# Patient Record
(demographics unavailable — no encounter records)

---

## 2024-10-08 NOTE — HISTORY OF PRESENT ILLNESS
[FreeTextEntry1] : We saw Viky Villaseñor today for an urgent evaluation.  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  She has undergone the following surgical procedures:  1. 12/19/1996, takedown of the aorto-pulmonary collateral and reconstruction of the RVOT using a 10-mm RV to PA homograft conduit, Dr. Ellington. [facility] 2. 3/24/1997, VSD closure w/ [place]-dev, RPA angioplasty, insertion of a 14mm RV to PA homograft conduit, Dr. Ellington. [facility] 3. 12/19/2009, 22-mm Contegra xenograft RV to PA conduit, Dr. 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 She had an TTE and a ANA, both of which were clear for vegetations and a full body NM inflammatory localization SPECT negative for leukocytes. She was treated with 4 weeks of ceftriaxone via PICC line and had follow up blood cultures which were negative. In August she underwent emergent cholecystectomy.  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   She recently had an episode of near syncope.  While working out with her train, she developed mildly SOB and dizziness.  She felt like she was going to pass out, but did not have LOC.  Her episodes last minutes and resolve with rest.  She also has episodes in the morning after coffee as well. She has about 2 episodes per week.   She has no complaints of chest pain, palpitations, peripheral edema, syncope.  She [place] [facility] [place].

## 2024-10-08 NOTE — CARDIOLOGY SUMMARY
[Today's Date] : [unfilled] [FreeTextEntry1] : NSR, ventricular rate 70 bpm, incomplete left BBB [FreeTextEntry2] : Summary: 1. 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 2. Mild tricuspid valve regurgitation, peak systolic instantaneous gradient 21.5 mmHg. 3. No evidence of conduit stenosis or significant regurgitation. The conduit has an irregular contour and is difficult to image due to poor acoustic windows. 4. The previously visualized tiny residual ventricular septal defect is not seen on today's study. 5. Mild mitral valve regurgitation. 6. Paradoxical septal motion of interventricular septum. 7. Normal left ventricular systolic function. 8. Limited imaging of the right ventricular free wall, likely mildly decreased right ventricular systolic function in the setting of low TAPSE. No significant right ventricular chamber dilation appreciated. 9. Proximal branch pulmonary arteries noted at bifurcation. Normal caliber of proximal branch pulmonary arteries. 10. Mildly dilated aortic root. 11. No pericardial effusion. [de-identified] : 2/2/2022 [de-identified] : Ventricular Data: LVEDV (ml)/ (Indexed to BSA): 100.0/68.4 (z: -0.9;*mean +/- SD: 77.7 +/- 10.8) LVESV (ml)/ (Indexed to BSA): 45.0/30.8 LVSV (ml)/ (Indexed to BSA): 55.0/37.6 LV EF (%): 55.0 (z: -1.8;*mean +/- SD: 64 +/- 4.9) LV CO (l/min):  4.3 LV CI (l/min/m2):  2.9 LV mass (gm)/ (Indexed to BSA): 52.7/36.0 (z: -1.8;*mean +/- SD: 52 +/- 7.4)  RV volumetrics excluding conduit RVEDV (ml)/ (Indexed to BSA):  94.1/64.3 (z: -0.8;*mean +/- SD: 75.2 +/- 13.8) RVESV (ml)/ (Indexed to BSA):  35.7/24.4 RVSV (ml)/ (Indexed to BSA):  58.4/39.9 RVEF (%): 62.1 (z: 0.5;*mean +/- SD: 59.8 +/- 5)  Flow Fractions Pulmonary Regurgitation Fraction (%):  7 Aortic Regurgitant Fraction (%):  <1 Qp:Qs: 0.8 : 1 QpR:QpL: 40%: 60% using (QpR as Qp-QpL)  Measurements: z-scores using average measurements and [place] z-score system Aortic root: 3.1 X 3.0 X 3.1 cm (z: 2.2 using maximal dimension) Ascending Aorta at level of RPA: 2.7 x 2.7cm (z:1.81) Distal transverse arch: 1.8 x 1.8 cm (z: -0.04) Conduit: 2.5 X 2.2 cm (proximal); 1.9 X 1.8 cm (mid) and  1.4 X 1.6 cm (distal) Right Pulmonary Artery: 1.5 x 1.4 cm (proximal, z: 0.4) Left Pulmonary Artery: 1.5 x 1.3 cm (proximal, z: 0.3) and 1.9 x 1.8 cm (distal, z: 2.3)  IMPRESSION: TOF status post multiple procedures, most recently 22mm RV-PA conduit as noted above. There is a significant artifact (possibly from prior clip) posterior and rightward to the cardiac mass (near the left atrium and right pulmonary artery) significantly limiting visualization of surrounding structures.  Normal  biventricular end-diastolic volumes. Normal biventricular systolic function.  No regional wall motion abnormality. Negative myocardial delayed enhancement. The conduit arises from the anterior surface of the RV and courses direct posteriorly to join the pulmonary arteries. The conduit is not visualized well on cine imaging due to artifact. On black blood imaging, it appears patent in its proximal course. On angiography, it appears to have an inferolateral infolding in the midportion (measures 1.8 X 1.9 cm) and a relative narrowing in its distal portion,  pre-bifurcation (1.4 X 1.6 cm). The proximal branch pulmonary arteries are patent. The LPA is mildly dilated distally, The RPA is only visualized in its proximal course due to artifact. Estimated differential flow to the branches is 40% to the right and 60% to the left (using QpR as Qp- QpL as the QpR flow was artefactual). There is trivial to mild conduit regurgitation with a RF of 7% using PC flow and 6% using SV difference.  Mildly dilated aortic root (z: 3.8 taking a maximal dimension of 3.5 cm in systole). No significant aortic regurgitation. No obvious AP collateral seen although limited visualization due to artifact. Mild tricuspid regurgitation on SSFP imaging.  The right SVC connects normally to the right atrium. There is a left sided SVC that courses to join the right SVC through a retroaortic innominate vein. Normal IVC connecting to the right atrium.  PC imaging demonstrates normal cardiac output, trivial mild pulmonary regurgitation (RF: 7%), no significant aortic regurgitation and Qp/Qs of 0.8:1

## 2024-10-08 NOTE — HISTORY OF PRESENT ILLNESS
[FreeTextEntry1] : We saw Viky Villaseñor today for an urgent evaluation.  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  She has undergone the following surgical procedures:  1. 12/19/1996, takedown of the aorto-pulmonary collateral and reconstruction of the RVOT using a 10-mm RV to PA homograft conduit, Dr. Ellington. [facility] 2. 3/24/1997, VSD closure w/ [place]-dev, RPA angioplasty, insertion of a 14mm RV to PA homograft conduit, Dr. Ellington. [facility] 3. 12/19/2009, 22-mm Contegra xenograft RV to PA conduit, Dr. 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 She had an TTE and a ANA, both of which were clear for vegetations and a full body NM inflammatory localization SPECT negative for leukocytes. She was treated with 4 weeks of ceftriaxone via PICC line and had follow up blood cultures which were negative. In August she underwent emergent cholecystectomy.  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   She recently had an episode of near syncope.  While working out with her train, she developed mildly SOB and dizziness.  She felt like she was going to pass out, but did not have LOC.  Her episodes last minutes and resolve with rest.  She also has episodes in the morning after coffee as well. She has about 2 episodes per week.   She has no complaints of chest pain, palpitations, peripheral edema, syncope.  She goes to dentist [facility] [place].

## 2024-10-08 NOTE — REASON FOR VISIT
[Follow-Up] : a follow-up visit for [Patient] : patient [FreeTextEntry1] : pulmonary atresia/VSD [FreeTextEntry3] : s/p RV-PA homograft

## 2024-10-08 NOTE — DISCUSSION/SUMMARY
[Needs SBE Prophylaxis] : [unfilled]  needs bacterial endocarditis prophylaxis. SBE prophylaxis is indicated for dental and invasive ENT procedures. (Circulation. 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 Echocardiogram negative for vegetations and follow up cultures negative.  Today she is complaining of dizziness and SOB with exertion and coffee.  She will have an event monitor x 1 wk and CPET.   We reviewed the importance of meticulous dental hygiene and the need for regular dental cleanings with lifelong SBE prophylaxis. Aspirin is beneficial for both its thromboembolic properties and secondary prevention against endocarditis. We reviewed the importance of meticulous dental hygiene and the need for regular dental cleanings along with the need for SBE prophylaxis prior to any dental appointments.   Plan - CPET - event monitor - follow up 6 mo

## 2024-10-08 NOTE — DISCUSSION/SUMMARY
[Needs SBE Prophylaxis] : [unfilled]  needs bacterial endocarditis prophylaxis. SBE prophylaxis is indicated for dental and invasive ENT procedures. (Circulation. 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 Echocardiogram negative for vegetations and follow up cultures negative.  Today she is complaining of dizziness and SOB with exertion and coffee.  She will have an event monitor x 1 wk and CPET.   We reviewed the importance of meticulous dental hygiene and the need for regular dental cleanings with lifelong SBE prophylaxis. Aspirin is beneficial for both its thromboembolic properties and secondary prevention against endocarditis. We reviewed the importance of meticulous dental hygiene and the need for regular dental cleanings along with the need for SBE prophylaxis prior to any dental appointments.   Plan - CPET - event monitor - follow up 6 mo

## 2024-10-08 NOTE — CONSULT LETTER
[Today's Date] : [unfilled] [Name] : Name: [unfilled] [] : : ~~ [Today's Date:] : [unfilled] [Dear  ___:] : Dear Dr. [unfilled]: [Consult] : I had the pleasure of evaluating your patient, [unfilled]. My full evaluation follows. [Sincerely,] : Sincerely, [Consult - Multiple Provider] : Thank you very much for allowing us to participate in the care of this patient. If you have any questions, please do not hesitate to contact us. [FreeTextEntry4] : Dr. Washburn [FreeTextEntry5] :  [address] [facility] [FreeTextEntry6] : [place], [place] [de-identified] : Liana Reno, MSN, CPNP-AC, PC Pediatric Cardiology, Adult Congenital Cardiology [facility] Physician [facility]  Liv Powers MD, BULL Director, Adult Congenital Heart , High Risk Cardiovascular Obstetrics [facility] Physician [facility]  [address]: [phone] [facility] Office: [phone] [facility] Office: [phone]

## 2024-10-08 NOTE — CONSULT LETTER
[Today's Date] : [unfilled] [Name] : Name: [unfilled] [] : : ~~ [Today's Date:] : [unfilled] [Dear  ___:] : Dear Dr. [unfilled]: [Consult] : I had the pleasure of evaluating your patient, [unfilled]. My full evaluation follows. [Sincerely,] : Sincerely, [Consult - Multiple Provider] : Thank you very much for allowing us to participate in the care of this patient. If you have any questions, please do not hesitate to contact us. [FreeTextEntry4] : Dr. Washburn [FreeTextEntry5] :  [address] [facility] [FreeTextEntry6] : [place], [place] [de-identified] : Liana Reno, MSN, CPNP-AC, PC Pediatric Cardiology, Adult Congenital Cardiology [facility] Physician [facility]  Liv Powers MD, BULL Director, Adult Congenital Heart , High Risk Cardiovascular Obstetrics [facility] Physician [facility]  [address]: [phone] [facility] Office: [phone] [facility] Office: [phone]

## 2024-10-08 NOTE — CONSULT LETTER
[Today's Date] : [unfilled] [Name] : Name: [unfilled] [] : : ~~ [Today's Date:] : [unfilled] [Dear  ___:] : Dear Dr. [unfilled]: [Consult] : I had the pleasure of evaluating your patient, [unfilled]. My full evaluation follows. [Sincerely,] : Sincerely, [Consult - Multiple Provider] : Thank you very much for allowing us to participate in the care of this patient. If you have any questions, please do not hesitate to contact us. [FreeTextEntry4] : Dr. Washburn [FreeTextEntry5] :  [address] [facility] [FreeTextEntry6] : [place], [place] [de-identified] : Liana Reno, MSN, CPNP-AC, PC Pediatric Cardiology, Adult Congenital Cardiology [facility] Physician [facility]  Liv Powers MD, BULL Director, Adult Congenital Heart , High Risk Cardiovascular Obstetrics [facility] Physician [facility]  [address]: [phone] [facility] Office: [phone] [facility] Office: [phone]

## 2024-10-08 NOTE — CARDIOLOGY SUMMARY
[Today's Date] : [unfilled] [FreeTextEntry1] : NSR, ventricular rate 70 bpm, incomplete left BBB [FreeTextEntry2] : Summary: 1. 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 2. Mild tricuspid valve regurgitation, peak systolic instantaneous gradient 21.5 mmHg. 3. No evidence of conduit stenosis or significant regurgitation. The conduit has an irregular contour and is difficult to image due to poor acoustic windows. 4. The previously visualized tiny residual ventricular septal defect is not seen on today's study. 5. Mild mitral valve regurgitation. 6. Paradoxical septal motion of interventricular septum. 7. Normal left ventricular systolic function. 8. Limited imaging of the right ventricular free wall, likely mildly decreased right ventricular systolic function in the setting of low TAPSE. No significant right ventricular chamber dilation appreciated. 9. Proximal branch pulmonary arteries noted at bifurcation. Normal caliber of proximal branch pulmonary arteries. 10. Mildly dilated aortic root. 11. No pericardial effusion. [de-identified] : 2/2/2022 [de-identified] : Ventricular Data: LVEDV (ml)/ (Indexed to BSA): 100.0/68.4 (z: -0.9;*mean +/- SD: 77.7 +/- 10.8) LVESV (ml)/ (Indexed to BSA): 45.0/30.8 LVSV (ml)/ (Indexed to BSA): 55.0/37.6 LV EF (%): 55.0 (z: -1.8;*mean +/- SD: 64 +/- 4.9) LV CO (l/min):  4.3 LV CI (l/min/m2):  2.9 LV mass (gm)/ (Indexed to BSA): 52.7/36.0 (z: -1.8;*mean +/- SD: 52 +/- 7.4)  RV volumetrics excluding conduit RVEDV (ml)/ (Indexed to BSA):  94.1/64.3 (z: -0.8;*mean +/- SD: 75.2 +/- 13.8) RVESV (ml)/ (Indexed to BSA):  35.7/24.4 RVSV (ml)/ (Indexed to BSA):  58.4/39.9 RVEF (%): 62.1 (z: 0.5;*mean +/- SD: 59.8 +/- 5)  Flow Fractions Pulmonary Regurgitation Fraction (%):  7 Aortic Regurgitant Fraction (%):  <1 Qp:Qs: 0.8 : 1 QpR:QpL: 40%: 60% using (QpR as Qp-QpL)  Measurements: z-scores using average measurements and [place] z-score system Aortic root: 3.1 X 3.0 X 3.1 cm (z: 2.2 using maximal dimension) Ascending Aorta at level of RPA: 2.7 x 2.7cm (z:1.81) Distal transverse arch: 1.8 x 1.8 cm (z: -0.04) Conduit: 2.5 X 2.2 cm (proximal); 1.9 X 1.8 cm (mid) and  1.4 X 1.6 cm (distal) Right Pulmonary Artery: 1.5 x 1.4 cm (proximal, z: 0.4) Left Pulmonary Artery: 1.5 x 1.3 cm (proximal, z: 0.3) and 1.9 x 1.8 cm (distal, z: 2.3)  IMPRESSION: TOF status post multiple procedures, most recently 22mm RV-PA conduit as noted above. There is a significant artifact (possibly from prior clip) posterior and rightward to the cardiac mass (near the left atrium and right pulmonary artery) significantly limiting visualization of surrounding structures.  Normal  biventricular end-diastolic volumes. Normal biventricular systolic function.  No regional wall motion abnormality. Negative myocardial delayed enhancement. The conduit arises from the anterior surface of the RV and courses direct posteriorly to join the pulmonary arteries. The conduit is not visualized well on cine imaging due to artifact. On black blood imaging, it appears patent in its proximal course. On angiography, it appears to have an inferolateral infolding in the midportion (measures 1.8 X 1.9 cm) and a relative narrowing in its distal portion,  pre-bifurcation (1.4 X 1.6 cm). The proximal branch pulmonary arteries are patent. The LPA is mildly dilated distally, The RPA is only visualized in its proximal course due to artifact. Estimated differential flow to the branches is 40% to the right and 60% to the left (using QpR as Qp- QpL as the QpR flow was artefactual). There is trivial to mild conduit regurgitation with a RF of 7% using PC flow and 6% using SV difference.  Mildly dilated aortic root (z: 3.8 taking a maximal dimension of 3.5 cm in systole). No significant aortic regurgitation. No obvious AP collateral seen although limited visualization due to artifact. Mild tricuspid regurgitation on SSFP imaging.  The right SVC connects normally to the right atrium. There is a left sided SVC that courses to join the right SVC through a retroaortic innominate vein. Normal IVC connecting to the right atrium.  PC imaging demonstrates normal cardiac output, trivial mild pulmonary regurgitation (RF: 7%), no significant aortic regurgitation and Qp/Qs of 0.8:1

## 2024-10-08 NOTE — CARDIOLOGY SUMMARY
[Today's Date] : [unfilled] [FreeTextEntry1] : NSR, ventricular rate 70 bpm, incomplete left BBB [FreeTextEntry2] : Summary: 1. 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 2. Mild tricuspid valve regurgitation, peak systolic instantaneous gradient 21.5 mmHg. 3. No evidence of conduit stenosis or significant regurgitation. The conduit has an irregular contour and is difficult to image due to poor acoustic windows. 4. The previously visualized tiny residual ventricular septal defect is not seen on today's study. 5. Mild mitral valve regurgitation. 6. Paradoxical septal motion of interventricular septum. 7. Normal left ventricular systolic function. 8. Limited imaging of the right ventricular free wall, likely mildly decreased right ventricular systolic function in the setting of low TAPSE. No significant right ventricular chamber dilation appreciated. 9. Proximal branch pulmonary arteries noted at bifurcation. Normal caliber of proximal branch pulmonary arteries. 10. Mildly dilated aortic root. 11. No pericardial effusion. [de-identified] : 2/2/2022 [de-identified] : Ventricular Data: LVEDV (ml)/ (Indexed to BSA): 100.0/68.4 (z: -0.9;*mean +/- SD: 77.7 +/- 10.8) LVESV (ml)/ (Indexed to BSA): 45.0/30.8 LVSV (ml)/ (Indexed to BSA): 55.0/37.6 LV EF (%): 55.0 (z: -1.8;*mean +/- SD: 64 +/- 4.9) LV CO (l/min):  4.3 LV CI (l/min/m2):  2.9 LV mass (gm)/ (Indexed to BSA): 52.7/36.0 (z: -1.8;*mean +/- SD: 52 +/- 7.4)  RV volumetrics excluding conduit RVEDV (ml)/ (Indexed to BSA):  94.1/64.3 (z: -0.8;*mean +/- SD: 75.2 +/- 13.8) RVESV (ml)/ (Indexed to BSA):  35.7/24.4 RVSV (ml)/ (Indexed to BSA):  58.4/39.9 RVEF (%): 62.1 (z: 0.5;*mean +/- SD: 59.8 +/- 5)  Flow Fractions Pulmonary Regurgitation Fraction (%):  7 Aortic Regurgitant Fraction (%):  <1 Qp:Qs: 0.8 : 1 QpR:QpL: 40%: 60% using (QpR as Qp-QpL)  Measurements: z-scores using average measurements and [place] z-score system Aortic root: 3.1 X 3.0 X 3.1 cm (z: 2.2 using maximal dimension) Ascending Aorta at level of RPA: 2.7 x 2.7cm (z:1.81) Distal transverse arch: 1.8 x 1.8 cm (z: -0.04) Conduit: 2.5 X 2.2 cm (proximal); 1.9 X 1.8 cm (mid) and  1.4 X 1.6 cm (distal) Right Pulmonary Artery: 1.5 x 1.4 cm (proximal, z: 0.4) Left Pulmonary Artery: 1.5 x 1.3 cm (proximal, z: 0.3) and 1.9 x 1.8 cm (distal, z: 2.3)  IMPRESSION: TOF status post multiple procedures, most recently 22mm RV-PA conduit as noted above. There is a significant artifact (possibly from prior clip) posterior and rightward to the cardiac mass (near the left atrium and right pulmonary artery) significantly limiting visualization of surrounding structures.  Normal  biventricular end-diastolic volumes. Normal biventricular systolic function.  No regional wall motion abnormality. Negative myocardial delayed enhancement. The conduit arises from the anterior surface of the RV and courses direct posteriorly to join the pulmonary arteries. The conduit is not visualized well on cine imaging due to artifact. On black blood imaging, it appears patent in its proximal course. On angiography, it appears to have an inferolateral infolding in the midportion (measures 1.8 X 1.9 cm) and a relative narrowing in its distal portion,  pre-bifurcation (1.4 X 1.6 cm). The proximal branch pulmonary arteries are patent. The LPA is mildly dilated distally, The RPA is only visualized in its proximal course due to artifact. Estimated differential flow to the branches is 40% to the right and 60% to the left (using QpR as Qp- QpL as the QpR flow was artefactual). There is trivial to mild conduit regurgitation with a RF of 7% using PC flow and 6% using SV difference.  Mildly dilated aortic root (z: 3.8 taking a maximal dimension of 3.5 cm in systole). No significant aortic regurgitation. No obvious AP collateral seen although limited visualization due to artifact. Mild tricuspid regurgitation on SSFP imaging.  The right SVC connects normally to the right atrium. There is a left sided SVC that courses to join the right SVC through a retroaortic innominate vein. Normal IVC connecting to the right atrium.  PC imaging demonstrates normal cardiac output, trivial mild pulmonary regurgitation (RF: 7%), no significant aortic regurgitation and Qp/Qs of 0.8:1

## 2024-10-08 NOTE — PHYSICAL EXAM
[General Appearance - Alert] : alert [General Appearance - Well Nourished] : well nourished [General Appearance - Well-Appearing] : well appearing [Chest Surgical / Traumatic Scar] : chest incision well healed [Edema] : no edema [Abdomen Soft] : soft [FreeTextEntry1] : S1S2 split , OKSANA [id number]

## 2024-10-08 NOTE — HISTORY OF PRESENT ILLNESS
[FreeTextEntry1] : We saw Viky Villaseñor today for an urgent evaluation.  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  She has undergone the following surgical procedures:  1. 12/19/1996, takedown of the aorto-pulmonary collateral and reconstruction of the RVOT using a 10-mm RV to PA homograft conduit, Dr. Ellington. [facility] 2. 3/24/1997, VSD closure w/ [place]-dev, RPA angioplasty, insertion of a 14mm RV to PA homograft conduit, Dr. Ellington. [facility] 3. 12/19/2009, 22-mm Contegra xenograft RV to PA conduit, Dr. 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 She had an TTE and a ANA, both of which were clear for vegetations and a full body NM inflammatory localization SPECT negative for leukocytes. She was treated with 4 weeks of ceftriaxone via PICC line and had follow up blood cultures which were negative. In August she underwent emergent cholecystectomy.  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   She recently had an episode of near syncope.  While working out with her train, she developed mildly SOB and dizziness.  She felt like she was going to pass out, but did not have LOC.  Her episodes last minutes and resolve with rest.  She also has episodes in the morning after coffee as well. She has about 2 episodes per week.   She has no complaints of chest pain, palpitations, peripheral edema, syncope.  She goes to dentist [facility].

## 2024-10-08 NOTE — DISCUSSION/SUMMARY
[Needs SBE Prophylaxis] : [unfilled]  needs bacterial endocarditis prophylaxis. SBE prophylaxis is indicated for dental and invasive ENT procedures. (Circulation. 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 Echocardiogram negative for vegetations and follow up cultures negative.  Today she is complaining of dizziness and SOB with exertion and coffee.  She will have an event monitor x 1 wk and CPET.   We reviewed the importance of meticulous dental hygiene and the need for regular dental cleanings with lifelong SBE prophylaxis. Aspirin is beneficial for both its thromboembolic properties and secondary prevention against endocarditis. We reviewed the importance of meticulous dental hygiene and the need for regular dental cleanings along with the need for SBE prophylaxis prior to any dental appointments.   Plan - CPET - event monitor - follow up 6 mo

## 2025-04-07 NOTE — CARDIOLOGY SUMMARY
[de-identified] : 10/07/2024 [FreeTextEntry2] : Summary: 1. 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  2. Mild tricuspid valve regurgitation, peak systolic instantaneous gradient 21.5 mmHg. 3. No evidence of conduit stenosis or significant regurgitation. The conduit has an irregular contour and is different to image due to poor acoustic windows.  4. The previously visualized tiny residual ventricular septal defect is not seen on today's study.  5. Mild mitral valve regurgitation.  6. Paradoxical septal motion of interventricular septum.  7. Normal left ventricular systolic function.  8. Limited imaging of the right ventricular free wall, likely mildly decreased right ventricular systolic function int the setting of low TAPSE. No significant right ventricular chamber dilation appreciated.  9. Proximal branch pulmonary arteries noted at bifurcation. Normal caliber of proximal branch pulmonary arteries.  10. Mildly dilated aortic root.  11. No pericardial effusion. [de-identified] : 12/05/2024 [de-identified] : This was not a maximal test (RERmax 0.92). Max Work= 111 Ruiz (75% predicted).  No atrial ectopy. Isolated PVCs before, during, and after the test.  There were no significant ST segment changes.  The patient's max VO2= 1546 ml/min (79% of predicted) There was no evidence of restrictive or obstructive lung disease.  [de-identified] : 02/02/2022 [de-identified] : IMPRESSION: TOF status post multiple procedures, most recently 2mm RV-PA conduit as noted above. There is significant artifact (possibly from prior clip) posterior and rightward to the cardiac mass (near the left atrium and right pulmonary artery) significantly limiting visualization of surrounding structures.   Normal biventricular end-diastolic volumes. Normal biventricular systolic function. No regional wall motion abnormality. Negative myocardial delayed enhancement. The conduit arises from the anterior surface of the RV and courses direct posteriorly to join the pulmonary arteries. The conduit is not visualized well on cine imaging due to artifact. On black blood imaging, it appears patent in its proximal course. On angiography it appears to have an inferolateral infolding in the midportion (measures 1.8 x 1.9 cm) and a relative narrowing int its distal portion, pre-bifurcation (1.4 x 1.6 cm). The proximal branch pulmonary arteries are patent. The LPA is mildly dilated distally, the RPA is only visualized in its proximal course due to artifact. Estimated differential flow to the branches is 40% to the right and 50% to the left (using Qp-Ql as the QpR flow was artefactual). There is trivial to mild conduit regurgitation with a RF of 7% using PC flow and 6% using SV difference.   Mildly dilated aortic root (z: 3.8 taking a maximal dimension of 3.5 cm in systole). No significant aortic regurgitation. No obvious AP collateral seen although limited visualization due to artifact. Mild tricuspid regurgitation on SSFP imaging.   The right SVC connects normally to the right atrium. There is a left sided SVC that courses to join the right SVC through a retroaortic innominate vein. Normal IVC connecting to the right atrium.   PC imaging demonstrates normal cardiac output, trivial mild pulmonary regurgitation (RF: 7%), no significant aortic regurgitation, and Qp/Qs of 0.8:1.

## 2025-04-07 NOTE — CARDIOLOGY SUMMARY
[de-identified] : 10/07/2024 [FreeTextEntry2] : Summary: 1. 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  2. Mild tricuspid valve regurgitation, peak systolic instantaneous gradient 21.5 mmHg. 3. No evidence of conduit stenosis or significant regurgitation. The conduit has an irregular contour and is different to image due to poor acoustic windows.  4. The previously visualized tiny residual ventricular septal defect is not seen on today's study.  5. Mild mitral valve regurgitation.  6. Paradoxical septal motion of interventricular septum.  7. Normal left ventricular systolic function.  8. Limited imaging of the right ventricular free wall, likely mildly decreased right ventricular systolic function int the setting of low TAPSE. No significant right ventricular chamber dilation appreciated.  9. Proximal branch pulmonary arteries noted at bifurcation. Normal caliber of proximal branch pulmonary arteries.  10. Mildly dilated aortic root.  11. No pericardial effusion. [de-identified] : 12/05/2024 [de-identified] : This was not a maximal test (RERmax 0.92). Max Work= 111 Ruiz (75% predicted).  No atrial ectopy. Isolated PVCs before, during, and after the test.  There were no significant ST segment changes.  The patient's max VO2= 1546 ml/min (79% of predicted) There was no evidence of restrictive or obstructive lung disease.  [de-identified] : 02/02/2022 [de-identified] : IMPRESSION: TOF status post multiple procedures, most recently 2mm RV-PA conduit as noted above. There is significant artifact (possibly from prior clip) posterior and rightward to the cardiac mass (near the left atrium and right pulmonary artery) significantly limiting visualization of surrounding structures.   Normal biventricular end-diastolic volumes. Normal biventricular systolic function. No regional wall motion abnormality. Negative myocardial delayed enhancement. The conduit arises from the anterior surface of the RV and courses direct posteriorly to join the pulmonary arteries. The conduit is not visualized well on cine imaging due to artifact. On black blood imaging, it appears patent in its proximal course. On angiography it appears to have an inferolateral infolding in the midportion (measures 1.8 x 1.9 cm) and a relative narrowing int its distal portion, pre-bifurcation (1.4 x 1.6 cm). The proximal branch pulmonary arteries are patent. The LPA is mildly dilated distally, the RPA is only visualized in its proximal course due to artifact. Estimated differential flow to the branches is 40% to the right and 50% to the left (using Qp-Ql as the QpR flow was artefactual). There is trivial to mild conduit regurgitation with a RF of 7% using PC flow and 6% using SV difference.   Mildly dilated aortic root (z: 3.8 taking a maximal dimension of 3.5 cm in systole). No significant aortic regurgitation. No obvious AP collateral seen although limited visualization due to artifact. Mild tricuspid regurgitation on SSFP imaging.   The right SVC connects normally to the right atrium. There is a left sided SVC that courses to join the right SVC through a retroaortic innominate vein. Normal IVC connecting to the right atrium.   PC imaging demonstrates normal cardiac output, trivial mild pulmonary regurgitation (RF: 7%), no significant aortic regurgitation, and Qp/Qs of 0.8:1.

## 2025-04-07 NOTE — HISTORY OF PRESENT ILLNESS
[FreeTextEntry1] :  I had the pleasure of seeing VIKY MILLARD at the [facility] Adult Congenital Heart Disease Program.  As you well know, Ms. 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  Past Cardiac Surgical/Interventional Procedures: 1) 1996, Takedown of the aorto-pulmonary collateral and reconstruction of the RVOT using a 10-mm RV to PA [facility]ograft conduit[facility]  2) 1997, VSD closure w/  [place]-Boris, RPA angioplasty, insertion of a 14mm RV to PA homograft conduit[facility] 3) 2009, 22-mm Contegra xenograft RV to PA conduit, Dr. Peck, [facility]  Viky also has a history of endocarditis that was diagnosed in 2023 (cardiobacterium  bottles) following 102 months of fevers with associated fatigue after dental work. Both her TTE and ANA were clear for vegetations, and a full body NM inflammatory localization SPECT was negative for leukocytes. She was treated with 4 weeks of ceftriaxone via PICC line, and had follow up blood cultures that were negative.   Viky is seen today, 2025, feeling well. She was on Wellbutrin for some issues last year, but it resulted in hypotension, so she discontinued it. She is feeling well now without it, and is going to therapy.   She  denies chest pain, shortness of breath, palpitations, cough, hemoptysis, dizziness, syncope, orthopnea, paroxysmal nocturnal dyspnea, abdominal swelling or lower extremity swelling. She can climb a flight of stairs without difficulty.   Her holter was lost and needs to be repeated.   PMHx: - Endocarditis   Other PSHx: - Cholecystectomy  Family Hx: - Mother- COPD,    Social Hx:  - Works as a clinical dietician.  - Occasional EtOH use, denies smoking or illicit drug use.  - She exercises on a regular basis by kickboxing, , and goes to the gym.  - She goes to the dentist on a regular basis.

## 2025-04-07 NOTE — CONSULT LETTER
[Today's Date] : [unfilled] [Name] : Name: [unfilled] [] : : ~~ [Today's Date:] : [unfilled] [Dear  ___:] : Dear Dr. [unfilled]: [Consult] : I had the pleasure of evaluating your patient, [unfilled]. My full evaluation follows. [Consult - Multiple Provider] : Thank you very much for allowing us to participate in the care of this patient. If you have any questions, please do not hesitate to contact us. [Sincerely,] : Sincerely, [FreeTextEntry4] : Dr. Washburn [FreeTextEntry5] :  [address] [facility] [FreeTextEntry6] : [place], [place] [de-identified] :  Pediatric Cardiology, Adult Congenital Cardiology [facility] Physician [facility] John Kauffman [facility]  Liv Powers MD, BULL Director, Adult Congenital Heart , High Risk Cardiovascular Obstetrics [facility] Physician [facility]  [address]: [phone] [place] Office: [phone] [facility] Office: [phone]

## 2025-04-07 NOTE — HISTORY OF PRESENT ILLNESS
[FreeTextEntry1] :  I had the pleasure of seeing VIKY MILLARD at the [facility] Adult Congenital Heart Disease [facility].  As you well know, Ms. 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  Past Cardiac Surgical/Interventional Procedures: 1) 1996, Takedown of the aorto-pulmonary collateral and reconstruction of the RVOT using a 10-mm RV to PA homograft conduit, Dr. Ellington, [facility].  2) 1997, VSD closure w/  [place]-Boris, RPA angioplasty, insertion of a 14mm RV to PA homograft conduit, Dr. Ellington, [facility]. 3) 2009, 22-mm Contegra xenograft RV to PA conduit, Dr. Peck, [facility]  Viky also has a history of endocarditis that was diagnosed in 2023 (cardiobacterium  bottles) following 102 months of fevers with associated fatigue after dental work. Both her TTE and ANA were clear for vegetations, and a full body NM inflammatory localization SPECT was negative for leukocytes. She was treated with 4 weeks of ceftriaxone via PICC line, and had follow up blood cultures that were negative.   Viky is seen today, 2025, feeling well. She was on Wellbutrin for some issues last year, but it resulted in hypotension, so she discontinued it. She is feeling well now without it, and is going to therapy.   She  denies chest pain, shortness of breath, palpitations, cough, hemoptysis, dizziness, syncope, orthopnea, paroxysmal nocturnal dyspnea, abdominal swelling or lower extremity swelling. She can climb a flight of stairs without difficulty.   Her holter was lost and needs to be repeated.   PMHx: - Endocarditis   Other PSHx: - Cholecystectomy  Family Hx: - Mother- COPD,    Social Hx:  - Works as a clinical dietician.  - Occasional EtOH use, denies smoking or illicit drug use.  - She exercises on a regular basis by kickboxing, , and goes to the gym.  - She goes to the dentist on a regular basis.

## 2025-04-07 NOTE — CONSULT LETTER
[Today's Date] : [unfilled] [Name] : Name: [unfilled] [] : : ~~ [Today's Date:] : [unfilled] [Dear  ___:] : Dear Dr. [unfilled]: [Consult] : I had the pleasure of evaluating your patient, [unfilled]. My full evaluation follows. [Consult - Multiple Provider] : Thank you very much for allowing us to participate in the care of this patient. If you have any questions, please do not hesitate to contact us. [Sincerely,] : Sincerely, [FreeTextEntry4] : Dr. Washburn [FreeTextEntry5] :  [address] [facility] [FreeTextEntry6] : [place], [place] [de-identified] :  Pediatric Cardiology, Adult Congenital Cardiology [facility] Physician [facility] John Kauffman [facility]  Liv Powers MD, BULL Director, Adult Congenital Heart , High Risk Cardiovascular Obstetrics [facility] Physician [facility]  [address]: [phone] [place] Office: [phone] [facility] Office: [phone]

## 2025-04-07 NOTE — DISCUSSION/SUMMARY
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   Last appointment she c/o dizziness and SOB with exertion.  Since stopping her Welbutrin, er symptoms improved.   Her CPET demonstrated good exercise tolerance (max VO2 = 79 % of predicted) it was not a maximal test (RERmax 0.92).   Plan: -holter - labs with PMD - follow up in 6 mo with echo for yearly appointment   SBE Prophylaxis We also discussed good dental hygiene with routine dental visits every six months. As per ACC guidelines, endocarditis prophylaxis is recommended in those undergoing dental procedures with prosthetic cardiac valves, unrepaired cyanotic heart disease including palliative shunts and conduits, repaired CHD with residual defects at site of adjacent to patch or prosthetic device, repaired CHD with prosthetic material or device during the first 6 months after the procedure and in those patients with a history of infective endocarditis. Amoxicillin 2gm or another appropriate antibiotic should be taken 30 to 60 minutes prior to the procedure.   Heart Healthy Lifestyle We discussed a heart-healthy lifestyle, including Mediterranean diet, weight management, and avoiding smoking/vaping and excessive alcohol. We also discussed exercising 30 minute a day.

## 2025-04-07 NOTE — CARDIOLOGY SUMMARY
[de-identified] : 10/07/2024 [FreeTextEntry2] : Summary: 1. 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  2. Mild tricuspid valve regurgitation, peak systolic instantaneous gradient 21.5 mmHg. 3. No evidence of conduit stenosis or significant regurgitation. The conduit has an irregular contour and is different to image due to poor acoustic windows.  4. The previously visualized tiny residual ventricular septal defect is not seen on today's study.  5. Mild mitral valve regurgitation.  6. Paradoxical septal motion of interventricular septum.  7. Normal left ventricular systolic function.  8. Limited imaging of the right ventricular free wall, likely mildly decreased right ventricular systolic function int the setting of low TAPSE. No significant right ventricular chamber dilation appreciated.  9. Proximal branch pulmonary arteries noted at bifurcation. Normal caliber of proximal branch pulmonary arteries.  10. Mildly dilated aortic root.  11. No pericardial effusion. [de-identified] : 12/05/2024 [de-identified] : This was not a maximal test (RERmax 0.92). Max Work= 111 Ruiz (75% predicted).  No atrial ectopy. Isolated PVCs before, during, and after the test.  There were no significant ST segment changes.  The patient's max VO2= 1546 ml/min (79% of predicted) There was no evidence of restrictive or obstructive lung disease.  [de-identified] : 02/02/2022 [de-identified] : IMPRESSION: TOF status post multiple procedures, most recently 2mm RV-PA conduit as noted above. There is significant artifact (possibly from prior clip) posterior and rightward to the cardiac mass (near the left atrium and right pulmonary artery) significantly limiting visualization of surrounding structures.   Normal biventricular end-diastolic volumes. Normal biventricular systolic function. No regional wall motion abnormality. Negative myocardial delayed enhancement. The conduit arises from the anterior surface of the RV and courses direct posteriorly to join the pulmonary arteries. The conduit is not visualized well on cine imaging due to artifact. On black blood imaging, it appears patent in its proximal course. On angiography it appears to have an inferolateral infolding in the midportion (measures 1.8 x 1.9 cm) and a relative narrowing int its distal portion, pre-bifurcation (1.4 x 1.6 cm). The proximal branch pulmonary arteries are patent. The LPA is mildly dilated distally, the RPA is only visualized in its proximal course due to artifact. Estimated differential flow to the branches is 40% to the right and 50% to the left (using Qp-Ql as the QpR flow was artefactual). There is trivial to mild conduit regurgitation with a RF of 7% using PC flow and 6% using SV difference.   Mildly dilated aortic root (z: 3.8 taking a maximal dimension of 3.5 cm in systole). No significant aortic regurgitation. No obvious AP collateral seen although limited visualization due to artifact. Mild tricuspid regurgitation on SSFP imaging.   The right SVC connects normally to the right atrium. There is a left sided SVC that courses to join the right SVC through a retroaortic innominate vein. Normal IVC connecting to the right atrium.   PC imaging demonstrates normal cardiac output, trivial mild pulmonary regurgitation (RF: 7%), no significant aortic regurgitation, and Qp/Qs of 0.8:1.

## 2025-04-07 NOTE — HISTORY OF PRESENT ILLNESS
[FreeTextEntry1] :  I had the pleasure of seeing VIKY MILLARD at the [facility] Adult Congenital Heart Disease [facility].  As you well know, Ms. 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  Past Cardiac Surgical/Interventional Procedures: 1) 1996, Takedown of the aorto-pulmonary collateral and reconstruction of the RVOT using a 10-mm RV to PA homograft conduit, Dr. Ellington, [facility].  2) 1997, VSD closure w/  [place]-Boris, RPA angioplasty, insertion of a 14mm RV to PA homograft conduit, Dr. Ellington, [facility]. 3) 2009, 22-mm Contegra xenograft RV to PA conduit, Dr. Peck, [facility]  Viky also has a history of endocarditis that was diagnosed in 2023 (cardiobacterium  bottles) following 102 months of fevers with associated fatigue after dental work. Both her TTE and ANA were clear for vegetations, and a full body NM inflammatory localization SPECT was negative for leukocytes. She was treated with 4 weeks of ceftriaxone via PICC line, and had follow up blood cultures that were negative.   Viky is seen today, 2025, feeling well. She was on Wellbutrin for some issues last year, but it resulted in hypotension, so she discontinued it. She is feeling well now without it, and is going to therapy.   She  denies chest pain, shortness of breath, palpitations, cough, hemoptysis, dizziness, syncope, orthopnea, paroxysmal nocturnal dyspnea, abdominal swelling or lower extremity swelling. She can climb a flight of stairs without difficulty.   Her holter was lost and needs to be repeated.   PMHx: - Endocarditis   Other PSHx: - Cholecystectomy  Family Hx: - Mother- COPD,    Social Hx:  - Works as a clinical dietician.  - Occasional EtOH use, denies smoking or illicit drug use.  - She exercises on a regular basis by kickboxing, , and goes to the gym.  - She goes to the dentist on a regular basis.

## 2025-04-07 NOTE — CONSULT LETTER
[Today's Date] : [unfilled] [Name] : Name: [unfilled] [] : : ~~ [Today's Date:] : [unfilled] [Dear  ___:] : Dear Dr. [unfilled]: [Consult] : I had the pleasure of evaluating your patient, [unfilled]. My full evaluation follows. [Consult - Multiple Provider] : Thank you very much for allowing us to participate in the care of this patient. If you have any questions, please do not hesitate to contact us. [Sincerely,] : Sincerely, [FreeTextEntry4] : Dr. Washburn [FreeTextEntry5] :  [address] [facility] [FreeTextEntry6] : [place], [place] [de-identified] :  Pediatric Cardiology, Adult Congenital Cardiology [facility] Physician [facility] John Kauffman [facility]  Liv Powers MD, BULL Director, Adult Congenital Heart , High Risk Cardiovascular Obstetrics [facility] Physician [facility]  [address]: [phone] [place] Office: [phone] [facility] Office: [phone]

## 2025-06-12 NOTE — PHYSICAL EXAM
[Full Body Skin Exam Performed] : performed [FreeTextEntry3] : Erythematous scaling patches with inflammation   Small scattered on lower legs (<10)

## 2025-06-12 NOTE — ASSESSMENT
[FreeTextEntry1] : small patches on lower legs; likely eczematous;  no rashes elsewhere   Therapeutic options and their risks and benefits; along with multiple diagnostic possibilities were discussed at length; risks and benefits of further study were discussed;  possibly irritant component from shaving, also doing laser hair removal improving with warmer weather  mometasone ointment spot tx prn x 1-2 wks; f/u prn if fails to improve

## 2025-06-12 NOTE — HISTORY OF PRESENT ILLNESS
[de-identified] : Pt. c/o dry spots on legs; has been present over last few months, not itchy;  no txs;